# Patient Record
Sex: MALE | Race: WHITE | NOT HISPANIC OR LATINO | URBAN - METROPOLITAN AREA
[De-identification: names, ages, dates, MRNs, and addresses within clinical notes are randomized per-mention and may not be internally consistent; named-entity substitution may affect disease eponyms.]

---

## 2017-01-27 ENCOUNTER — EMERGENCY (EMERGENCY)
Facility: HOSPITAL | Age: 42
LOS: 1 days | Discharge: HOME | End: 2017-01-27
Attending: EMERGENCY MEDICINE | Admitting: EMERGENCY MEDICINE
Payer: SELF-PAY

## 2017-01-27 VITALS
RESPIRATION RATE: 14 BRPM | TEMPERATURE: 97 F | HEIGHT: 71 IN | HEART RATE: 74 BPM | SYSTOLIC BLOOD PRESSURE: 133 MMHG | WEIGHT: 184.97 LBS | DIASTOLIC BLOOD PRESSURE: 80 MMHG | OXYGEN SATURATION: 98 %
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
## 2017-01-27 DIAGNOSIS — S93.401A SPRAIN OF UNSPECIFIED LIGAMENT OF RIGHT ANKLE, INITIAL ENCOUNTER: ICD-10-CM

## 2017-01-27 DIAGNOSIS — Z88.0 ALLERGY STATUS TO PENICILLIN: ICD-10-CM

## 2017-01-27 DIAGNOSIS — S99.911A UNSPECIFIED INJURY OF RIGHT ANKLE, INITIAL ENCOUNTER: ICD-10-CM
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
## 2017-01-27 PROCEDURE — 99283 EMERGENCY DEPT VISIT LOW MDM: CPT

## 2017-01-27 PROCEDURE — 73610 X-RAY EXAM OF ANKLE: CPT

## 2017-01-27 PROCEDURE — 73610 X-RAY EXAM OF ANKLE: CPT | Mod: 26,RT

## 2017-01-27 PROCEDURE — 99283 EMERGENCY DEPT VISIT LOW MDM: CPT | Mod: 25

## 2017-01-27 RX ORDER — MESALAMINE 400 MG
0 TABLET, DELAYED RELEASE (ENTERIC COATED) ORAL
Qty: 0 | Refills: 0 | COMMUNITY

## 2017-01-27 RX ORDER — IBUPROFEN 200 MG
600 TABLET ORAL ONCE
Qty: 0 | Refills: 0 | Status: COMPLETED | OUTPATIENT
Start: 2017-01-27 | End: 2017-01-27

## 2017-01-27 RX ORDER — CYCLOBENZAPRINE HYDROCHLORIDE 10 MG/1
1 TABLET, FILM COATED ORAL
Qty: 15 | Refills: 0 | OUTPATIENT
Start: 2017-01-27 | End: 2017-02-01

## 2017-01-27 RX ADMIN — Medication 600 MILLIGRAM(S): at 11:38

## 2017-01-27 RX ADMIN — Medication 600 MILLIGRAM(S): at 11:20

## 2017-01-27 NOTE — ED PROCEDURE NOTE - NS ED PERI VASCULAR NEG
fingers/toes warm to touch/capillary refill time < 2 seconds/no paresthesia/no cyanosis of extremity/no swelling

## 2017-01-27 NOTE — ED PROVIDER NOTE - OBJECTIVE STATEMENT
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

## 2019-12-16 ENCOUNTER — EMERGENCY (EMERGENCY)
Facility: HOSPITAL | Age: 44
LOS: 1 days | Discharge: HOME | End: 2019-12-16
Attending: EMERGENCY MEDICINE
Payer: COMMERCIAL

## 2019-12-16 VITALS
SYSTOLIC BLOOD PRESSURE: 104 MMHG | OXYGEN SATURATION: 99 % | HEART RATE: 93 BPM | RESPIRATION RATE: 18 BRPM | DIASTOLIC BLOOD PRESSURE: 69 MMHG | TEMPERATURE: 98 F | WEIGHT: 179.9 LBS | HEIGHT: 71 IN

## 2019-12-16 PROBLEM — K52.9 NONINFECTIVE GASTROENTERITIS AND COLITIS, UNSPECIFIED: Chronic | Status: ACTIVE | Noted: 2017-01-27

## 2019-12-16 PROCEDURE — 93971 EXTREMITY STUDY: CPT | Mod: 26,RT

## 2019-12-16 PROCEDURE — 93971 EXTREMITY STUDY: CPT

## 2019-12-16 PROCEDURE — 96372 THER/PROPH/DIAG INJ SC/IM: CPT

## 2019-12-16 PROCEDURE — 99284 EMERGENCY DEPT VISIT MOD MDM: CPT | Mod: 25

## 2019-12-16 PROCEDURE — 99284 EMERGENCY DEPT VISIT MOD MDM: CPT

## 2019-12-16 RX ORDER — LIDOCAINE 4 G/100G
1 CREAM TOPICAL ONCE
Refills: 0 | Status: COMPLETED | OUTPATIENT
Start: 2019-12-16 | End: 2019-12-16

## 2019-12-16 RX ORDER — CYCLOBENZAPRINE HYDROCHLORIDE 10 MG/1
1 TABLET, FILM COATED ORAL
Qty: 12 | Refills: 0
Start: 2019-12-16 | End: 2019-12-19

## 2019-12-16 RX ORDER — KETOROLAC TROMETHAMINE 30 MG/ML
30 SYRINGE (ML) INJECTION ONCE
Refills: 0 | Status: DISCONTINUED | OUTPATIENT
Start: 2019-12-16 | End: 2019-12-16

## 2019-12-16 RX ADMIN — LIDOCAINE 1 PATCH: 4 CREAM TOPICAL at 10:54

## 2019-12-16 RX ADMIN — Medication 30 MILLIGRAM(S): at 10:54

## 2019-12-16 NOTE — ED PROVIDER NOTE - CLINICAL SUMMARY MEDICAL DECISION MAKING FREE TEXT BOX
43m with atraumatic R knee pain x several hours. Pain localized to posterior R knee. recent surgery on L knee. Will US to assess for DVT / cyst. Pain control and reassess.

## 2019-12-16 NOTE — ED ADULT TRIAGE NOTE - CHIEF COMPLAINT QUOTE
c/o right knee pain, was walking to go to therapy and now has pain, had surgery on left knee 1 mth ago, pt on crutches and thinks the pain is from favoring his right knee

## 2019-12-16 NOTE — ED ADULT NURSE NOTE - OBJECTIVE STATEMENT
43 year old male, presents to the ED with right knee pain, patient states that he had surgery on his left knee on 10/31 and has been using crutches at home. patient states that this morning he thinks he stepped wrong and injured his right knee. Patient having pain with weight bear bearing   Patient A/Ox4, respirations are even and non labored, lungs clear bilaterally, abdomen is soft and non tender, limited ROM in legs due to pain. NAD noted.

## 2019-12-16 NOTE — ED PROVIDER NOTE - OBJECTIVE STATEMENT
Pt is a 42 y/o m, PMH significant for R knee meniscal repair ( Dr. Ladd 10/31/19), presents to ED c/o L knee pain x several x hours. Pt states he was walking out of physical therapy this morning when he felt a pop behind his L knee. Pt did not fall or strike his knee during the event. Pt took Tylenol prior to arrival with no relief of symptoms. Pt has no other complaints at this time. Denies paresthesias, weakness, fevers, chills, back pain.

## 2019-12-16 NOTE — ED PROVIDER NOTE - PROGRESS NOTE DETAILS
PA NOTE: Results of US reviewed " No evidence of right lower extremity deep venous thrombosis.  Right popliteal fossa fluid collection, possibly complex Baker's cyst.". Will treat with course of PO flexeril. Pt advised to follow up with primary ortho MD upon discharge PA NOTE: Results of US reviewed " No evidence of right lower extremity deep venous thrombosis.  Right popliteal fossa fluid collection, possibly complex Baker's cyst.". Will treat with course of PO flexeril. Pt advised to follow up with primary ortho MD upon discharge and return to ED if symptoms worsen.

## 2019-12-16 NOTE — ED ADULT NURSE NOTE - NSIMPLEMENTINTERV_GEN_ALL_ED
Implemented All Universal Safety Interventions:  [place] to call system. Call bell, personal items and telephone within reach. Instruct patient to call for assistance. Room bathroom lighting operational. Non-slip footwear when patient is off stretcher. Physically safe environment: no spills, clutter or unnecessary equipment. Stretcher in lowest position, wheels locked, appropriate side rails in place.

## 2019-12-16 NOTE — ED PROVIDER NOTE - PATIENT PORTAL LINK FT
You can access the FollowMyHealth Patient Portal offered by [facility] by registering at the following website: [email or web address]. By joining [employer]’s FollowMyHealth portal, you will also be able to view your health information using other applications (apps) compatible with our system.

## 2021-04-23 NOTE — ED PROVIDER NOTE - ATTENDING CONTRIBUTION TO CARE
Continue beta blocker  Will be able to resume pradaxa if okay with neurosurgery   I, Jesus Flores, performed a face to face bedside interview with this patient regarding history of present illness, review of symptoms and relevant past medical, social and family history.  I completed an independent physical examination. I have communicated the patient’s plan of care and disposition with the ACP.  43 year old presents with L posterior knee pain after hearing pop. No trauma  Gen: NAD, well appearing  CV: RRR  Pul: CTA b/l  Abd: Soft, non-distended, non-tender  Neuro: no focal deficits  msk: mild tenderness to popliteal fossa, no bony tenderness  Pt improved, full ROM, stable for dc